# Patient Record
Sex: FEMALE | Race: WHITE | NOT HISPANIC OR LATINO | Employment: FULL TIME | URBAN - METROPOLITAN AREA
[De-identification: names, ages, dates, MRNs, and addresses within clinical notes are randomized per-mention and may not be internally consistent; named-entity substitution may affect disease eponyms.]

---

## 2018-05-25 ENCOUNTER — TELEPHONE (OUTPATIENT)
Dept: CARDIOTHORACIC SURGERY | Facility: CLINIC | Age: 38
End: 2018-05-25

## 2018-05-25 NOTE — TELEPHONE ENCOUNTER
Returned pt's call.  Pt stated that she would like to be seen for TOS.  Pt scheduled to see Dr. Heredia on June 27.    ----- Message from Vianca Galvan sent at 5/25/2018 12:31 PM CDT -----  Contact: Self [phone]  Pt is requesting a call back from the nurse to schedule as a new patient for suspected thoracic outlet syndrome.  Pt had an EMG last October and has been working with a physical therapist and they suspect she may have this.  Patient has also seen an ortho, but she has muscle wasting and atrophy in both hands.    Pt may be reached at [phone].    Thank you.  ROMULO

## 2018-06-27 ENCOUNTER — OFFICE VISIT (OUTPATIENT)
Dept: CARDIOTHORACIC SURGERY | Facility: CLINIC | Age: 38
End: 2018-06-27
Payer: COMMERCIAL

## 2018-06-27 VITALS
BODY MASS INDEX: 22.06 KG/M2 | SYSTOLIC BLOOD PRESSURE: 111 MMHG | OXYGEN SATURATION: 100 % | HEART RATE: 78 BPM | HEIGHT: 64 IN | DIASTOLIC BLOOD PRESSURE: 67 MMHG | WEIGHT: 129.19 LBS | TEMPERATURE: 98 F

## 2018-06-27 DIAGNOSIS — G54.0 TOS (THORACIC OUTLET SYNDROME): Primary | ICD-10-CM

## 2018-06-27 PROCEDURE — 99999 PR PBB SHADOW E&M-EST. PATIENT-LVL III: CPT | Mod: PBBFAC,,, | Performed by: THORACIC SURGERY (CARDIOTHORACIC VASCULAR SURGERY)

## 2018-06-27 PROCEDURE — 3008F BODY MASS INDEX DOCD: CPT | Mod: CPTII,S$GLB,, | Performed by: THORACIC SURGERY (CARDIOTHORACIC VASCULAR SURGERY)

## 2018-06-27 PROCEDURE — 99205 OFFICE O/P NEW HI 60 MIN: CPT | Mod: S$GLB,,, | Performed by: THORACIC SURGERY (CARDIOTHORACIC VASCULAR SURGERY)

## 2018-06-27 RX ORDER — BISOPROLOL FUMARATE 5 MG/1
2.5 TABLET, FILM COATED ORAL DAILY
COMMUNITY

## 2018-06-27 RX ORDER — ZOLPIDEM TARTRATE 10 MG/1
TABLET ORAL
COMMUNITY
Start: 2018-06-08

## 2018-06-27 RX ORDER — FLUTICASONE PROPIONATE 50 MCG
SPRAY, SUSPENSION (ML) NASAL
COMMUNITY
Start: 2018-06-08

## 2018-06-27 RX ORDER — DEXTROAMPHETAMINE SACCHARATE, AMPHETAMINE ASPARTATE, DEXTROAMPHETAMINE SULFATE AND AMPHETAMINE SULFATE 2.5; 2.5; 2.5; 2.5 MG/1; MG/1; MG/1; MG/1
TABLET ORAL
COMMUNITY
Start: 2018-06-13

## 2018-06-27 NOTE — PROGRESS NOTES
"History & Physical    SUBJECTIVE:     History of Present Illness:  Patient is a 38 y.o. female presents, self referred, with possible TOS.  Her symptoms began 2 years ago while she was pregnant which consisted of bilateral upper extremity weakness.  This progressed and she was referred for an EMG.  This revealed bilateral anterior interosseous neuropathy.  She was recommended to have bilateral anterior interosseous nerve release at pronator.  She declined this surgery at the time and was referred to PT.  She has been doing PT since November 2017.  She relates this has helped her strength and tingling sensations however she states she still isn't "normal."  The PT she has been working with thought perhaps she should see someone for TOS.  Hence her self referral here.    Chief Complaint   Patient presents with    Consult       Review of patient's allergies indicates:  No Known Allergies    Current Outpatient Prescriptions   Medication Sig Dispense Refill    bisoprolol (ZEBETA) 5 MG tablet Take 2.5 mg by mouth once daily.      dextroamphetamine-amphetamine 10 mg Tab       fluticasone (FLONASE) 50 mcg/actuation nasal spray       zolpidem (AMBIEN) 10 mg Tab        No current facility-administered medications for this visit.        No past medical history on file.  No past surgical history on file.  No family history on file.  Social History   Substance Use Topics    Smoking status: Former Smoker     Packs/day: 0.10     Years: 1.00     Types: Cigarettes     Start date: 6/27/2015     Quit date: 6/27/2016    Smokeless tobacco: Never Used    Alcohol use No          Review of Systems     Review of Systems   Constitutional: Negative for fever and malaise/fatigue.   HENT: Negative for congestion and sore throat.    Eyes: Negative for blurred vision, double vision and discharge.   Respiratory: Negative for cough, shortness of breath and wheezing.    Cardiovascular: Negative for chest pain, palpitations, claudication, leg " "swelling and PND.   Gastrointestinal: Negative for abdominal pain, constipation, diarrhea, nausea and vomiting.   Genitourinary: Negative for dysuria, frequency and urgency.   Musculoskeletal: Negative for back pain and myalgias.   Skin: Negative for itching and rash.   Neurological: Negative for dizziness, speech change, seizures, weakness and she reports occipital headaches.   Endo/Heme/Allergies: Does not bruise/bleed easily.   Psychiatric/Behavioral: Negative for depression. The patient is not nervous/anxious.    Ext:      OBJECTIVE:     Vital Signs (Most Recent)  Temp: 98 °F (36.7 °C) (06/27/18 0955)  Pulse: 78 (06/27/18 0955)  BP: 111/67 (06/27/18 0955)  SpO2: 100 % (06/27/18 0955)  5' 4" (1.626 m)  58.6 kg (129 lb 3 oz)     Physical Exam     Physical Exam   Constitutional: Patient appears well-developed and well-nourished.   HENT:   Head: Normocephalic and atraumatic.   Eyes: Pupils are equal, round, and reactive to light.   Neck: Normal range of motion. Neck supple.   Cardiovascular: Normal rate, regular rhythm and normal heart sounds.    Pulmonary/Chest: Effort normal and breath sounds normal.   Abdominal: Soft. Bowel sounds are normal.   Musculoskeletal: Normal range of motion.   Neurological: Alert and oriented to person, place, and time.   Skin: Skin is warm and dry.   Psychiatric: Normal mood and affect. Behavior is normal.   Adsons Test Negative for pain or pulse  EAST positve at 1 min for numbness and paraesthesia in both hands    Bilateral Hand strength is diminished and she has muscle wasting of all groups    Not tender over the scalene triangle but bilateral tenderness over both pec minor tendons    Laboratory  None to review    Diagnostic Results:  None for review    ASSESSMENT/PLAN:   38 year old female with bilateral upper extremity numbness and loss of strength, however improved since beginning PT in November 2017.  Presents, self referred, after PT thought perhaps she might have " TOS.    PLAN:  I spoke with the patient and her  in detail about NTOS. 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 She has had symptoms for 2 years which are improving with good PT. She still has muscle loss but she states her strength is improving. I would like her to see a neurologist and have her Cspine evaluated as well as a regional pain specialist for scalene and pec minor blocks. If she wants to come back here we can have her see Dr Villalobos.

## 2021-12-06 ENCOUNTER — PATIENT MESSAGE (OUTPATIENT)
Dept: RESEARCH | Facility: HOSPITAL | Age: 41
End: 2021-12-06
Payer: COMMERCIAL